# Patient Record
Sex: MALE | Race: WHITE
[De-identification: names, ages, dates, MRNs, and addresses within clinical notes are randomized per-mention and may not be internally consistent; named-entity substitution may affect disease eponyms.]

---

## 2018-05-21 ENCOUNTER — HOSPITAL ENCOUNTER (EMERGENCY)
Dept: HOSPITAL 57 - BURERS | Age: 78
Discharge: HOME | End: 2018-05-21
Payer: MEDICARE

## 2018-05-21 DIAGNOSIS — G20: ICD-10-CM

## 2018-05-21 DIAGNOSIS — I50.9: ICD-10-CM

## 2018-05-21 DIAGNOSIS — M10.9: ICD-10-CM

## 2018-05-21 DIAGNOSIS — B02.9: Primary | ICD-10-CM

## 2018-05-21 PROCEDURE — 99284 EMERGENCY DEPT VISIT MOD MDM: CPT

## 2019-09-11 ENCOUNTER — HOSPITAL ENCOUNTER (EMERGENCY)
Dept: HOSPITAL 57 - BURERS | Age: 79
Discharge: NURSING, REHAB OR LONG-TERM CARE | End: 2019-09-11
Payer: MEDICARE

## 2019-09-11 DIAGNOSIS — S10.11XA: Primary | ICD-10-CM

## 2019-09-11 DIAGNOSIS — I50.9: ICD-10-CM

## 2019-09-11 DIAGNOSIS — M10.9: ICD-10-CM
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
## 2019-09-11 DIAGNOSIS — G20: ICD-10-CM

## 2019-09-11 PROCEDURE — 71045 X-RAY EXAM CHEST 1 VIEW: CPT

## 2019-09-11 NOTE — RAD
PORTABLE CHEST

9/11/19

 

An AP portable film at 1316 is compared with a 12/24/15 study. 

 

The heart is normal in size for AP projection and body habitus. There is no vascular congestion, angel
a, or pleural effusion. There was no signs of pneumonia. 

 

IMPRESSION: 

No acute thoracic finding. 

 

POS: HOME

## 2019-11-06 ENCOUNTER — HOSPITAL ENCOUNTER (EMERGENCY)
Dept: HOSPITAL 57 - BURERS | Age: 79
LOS: 1 days | Discharge: NURSING, REHAB OR LONG-TERM CARE | End: 2019-11-07
Payer: MEDICARE

## 2019-11-06 DIAGNOSIS — I50.9: ICD-10-CM

## 2019-11-06 DIAGNOSIS — M10.9: ICD-10-CM

## 2019-11-06 DIAGNOSIS — F41.9: ICD-10-CM

## 2019-11-06 DIAGNOSIS — R06.02: Primary | ICD-10-CM

## 2019-11-06 DIAGNOSIS — Z79.899: ICD-10-CM

## 2019-11-06 DIAGNOSIS — R09.02: ICD-10-CM

## 2019-11-06 PROCEDURE — 71045 X-RAY EXAM CHEST 1 VIEW: CPT

## 2019-11-07 NOTE — RAD
PORTABLE CHEST:

 

DATE: 11/6/2019.

 

FINDINGS: 

An AP portable film at 2101 is compared with a 9/11/2019 study.

 

Heart size is unchanged.  There is no clear congestion of the vessels.  No lobar consolidation or eff
usion was seen.  Streaking in the left base is no different than before and is probably scarring.  

 

IMPRESSION: 

No change since the prior exam.

 

POS: [facility]

## 2020-04-02 ENCOUNTER — HOSPITAL ENCOUNTER (INPATIENT)
Dept: HOSPITAL 92 - ERS | Age: 80
LOS: 3 days | Discharge: NURSING, REHAB OR LONG-TERM CARE | DRG: 871 | End: 2020-04-05
Attending: INTERNAL MEDICINE | Admitting: INTERNAL MEDICINE
Payer: MEDICARE

## 2020-04-02 VITALS — BODY MASS INDEX: 22.7 KG/M2

## 2020-04-02 DIAGNOSIS — Z88.1: ICD-10-CM

## 2020-04-02 DIAGNOSIS — N39.0: ICD-10-CM

## 2020-04-02 DIAGNOSIS — G93.41: ICD-10-CM

## 2020-04-02 DIAGNOSIS — M10.9: ICD-10-CM

## 2020-04-02 DIAGNOSIS — A41.9: Primary | ICD-10-CM

## 2020-04-02 DIAGNOSIS — Z88.8: ICD-10-CM

## 2020-04-02 DIAGNOSIS — Z79.899: ICD-10-CM

## 2020-04-02 DIAGNOSIS — F02.80: ICD-10-CM

## 2020-04-02 DIAGNOSIS — F41.9: ICD-10-CM

## 2020-04-02 DIAGNOSIS — Z85.828: ICD-10-CM

## 2020-04-02 DIAGNOSIS — G20: ICD-10-CM

## 2020-04-02 DIAGNOSIS — R13.12: ICD-10-CM

## 2020-04-02 DIAGNOSIS — L89.151: ICD-10-CM

## 2020-04-02 LAB
BASOPHILS # BLD AUTO: 0.1 THOU/UL (ref 0–0.2)
BASOPHILS NFR BLD AUTO: 1 % (ref 0–1)
EOSINOPHIL # BLD AUTO: 0.1 THOU/UL (ref 0–0.7)
EOSINOPHIL NFR BLD AUTO: 1.8 % (ref 0–10)
HGB BLD-MCNC: 10.9 G/DL (ref 14–18)
LYMPHOCYTES # BLD: 3.3 THOU/UL (ref 1.2–3.4)
LYMPHOCYTES NFR BLD AUTO: 38.8 % (ref 21–51)
MCH RBC QN AUTO: 32.5 PG (ref 27–31)
MCV RBC AUTO: 99.3 FL (ref 78–98)
MONOCYTES # BLD AUTO: 0.6 THOU/UL (ref 0.11–0.59)
MONOCYTES NFR BLD AUTO: 7.3 % (ref 0–10)
NEUTROPHILS # BLD AUTO: 4.3 THOU/UL (ref 1.4–6.5)
NEUTROPHILS NFR BLD AUTO: 51.2 % (ref 42–75)
PLATELET # BLD AUTO: 206 THOU/UL (ref 130–400)
RBC # BLD AUTO: 3.36 MILL/UL (ref 4.7–6.1)
WBC # BLD AUTO: 8.4 THOU/UL (ref 4.8–10.8)

## 2020-04-02 PROCEDURE — 80048 BASIC METABOLIC PNL TOTAL CA: CPT

## 2020-04-02 PROCEDURE — 81003 URINALYSIS AUTO W/O SCOPE: CPT

## 2020-04-02 PROCEDURE — 87040 BLOOD CULTURE FOR BACTERIA: CPT

## 2020-04-02 PROCEDURE — 36415 COLL VENOUS BLD VENIPUNCTURE: CPT

## 2020-04-02 PROCEDURE — 85025 COMPLETE CBC W/AUTO DIFF WBC: CPT

## 2020-04-02 PROCEDURE — 93005 ELECTROCARDIOGRAM TRACING: CPT

## 2020-04-02 PROCEDURE — 80053 COMPREHEN METABOLIC PANEL: CPT

## 2020-04-02 PROCEDURE — U0001 2019-NCOV DIAGNOSTIC P: HCPCS

## 2020-04-02 PROCEDURE — 83605 ASSAY OF LACTIC ACID: CPT

## 2020-04-02 PROCEDURE — 87804 INFLUENZA ASSAY W/OPTIC: CPT

## 2020-04-02 PROCEDURE — 81015 MICROSCOPIC EXAM OF URINE: CPT

## 2020-04-02 PROCEDURE — 84484 ASSAY OF TROPONIN QUANT: CPT

## 2020-04-02 PROCEDURE — 71045 X-RAY EXAM CHEST 1 VIEW: CPT

## 2020-04-02 NOTE — RAD
RADIOGRAPH CHEST 1 VIEW:



DATE:

4/2/2020



HISTORY:

79-year-old male with dyspnea and hypotension



FINDINGS:

The thoracic aorta is tortuous and ectatic. There is no evidence of consolidation, pulmonary edema, c
ardiomegaly or pneumothorax. The lateral costophrenic angles are not effaced. Prominent

interstitial markings at the lower lung zones, probably chronic. The only interval change compared to
 prior study of 11/6/2019 is the current presence of platelike density at left lower lung zone.



IMPRESSION:

1) No compelling evidence of acute cardiopulmonary disease.

2) ectasia of thoracic aorta.

3) subsegmental atelectasis at left lower lung zone.



Reported By: George Evans 

Electronically Signed:  4/2/2020 11:24 PM

## 2020-04-03 LAB
ALBUMIN SERPL BCG-MCNC: 2.7 G/DL (ref 3.4–4.8)
ALBUMIN SERPL BCG-MCNC: 3 G/DL (ref 3.4–4.8)
ALP SERPL-CCNC: 108 U/L (ref 40–110)
ALP SERPL-CCNC: 118 U/L (ref 40–110)
ALT SERPL W P-5'-P-CCNC: (no result) U/L (ref 8–55)
ALT SERPL W P-5'-P-CCNC: (no result) U/L (ref 8–55)
ANION GAP SERPL CALC-SCNC: 10 MMOL/L (ref 10–20)
ANION GAP SERPL CALC-SCNC: 11 MMOL/L (ref 10–20)
AST SERPL-CCNC: 10 U/L (ref 5–34)
AST SERPL-CCNC: 10 U/L (ref 5–34)
BACTERIA UR QL AUTO: (no result) HPF
BASOPHILS # BLD AUTO: 0.1 THOU/UL (ref 0–0.2)
BASOPHILS NFR BLD AUTO: 0.4 % (ref 0–1)
BILIRUB SERPL-MCNC: 0.3 MG/DL (ref 0.2–1.2)
BILIRUB SERPL-MCNC: 0.3 MG/DL (ref 0.2–1.2)
BUN SERPL-MCNC: 25 MG/DL (ref 8.4–25.7)
BUN SERPL-MCNC: 29 MG/DL (ref 8.4–25.7)
CALCIUM SERPL-MCNC: 7.9 MG/DL (ref 7.8–10.44)
CALCIUM SERPL-MCNC: 8.1 MG/DL (ref 7.8–10.44)
CHLORIDE SERPL-SCNC: 109 MMOL/L (ref 98–107)
CHLORIDE SERPL-SCNC: 112 MMOL/L (ref 98–107)
CO2 SERPL-SCNC: 23 MMOL/L (ref 23–31)
CO2 SERPL-SCNC: 26 MMOL/L (ref 23–31)
CREAT CL PREDICTED SERPL C-G-VRATE: 0 ML/MIN (ref 70–130)
CREAT CL PREDICTED SERPL C-G-VRATE: 0 ML/MIN (ref 70–130)
EOSINOPHIL # BLD AUTO: 0.1 THOU/UL (ref 0–0.7)
EOSINOPHIL NFR BLD AUTO: 1 % (ref 0–10)
GLOBULIN SER CALC-MCNC: 2.8 G/DL (ref 2.4–3.5)
GLOBULIN SER CALC-MCNC: 3.1 G/DL (ref 2.4–3.5)
GLUCOSE SERPL-MCNC: 110 MG/DL (ref 83–110)
GLUCOSE SERPL-MCNC: 95 MG/DL (ref 83–110)
HGB BLD-MCNC: 10.7 G/DL (ref 14–18)
LEUKOCYTE ESTERASE UR QL STRIP.AUTO: 250 LEU/UL
LYMPHOCYTES # BLD: 3 THOU/UL (ref 1.2–3.4)
LYMPHOCYTES NFR BLD AUTO: 22.4 % (ref 21–51)
MCH RBC QN AUTO: 32.2 PG (ref 27–31)
MCV RBC AUTO: 97.9 FL (ref 78–98)
MONOCYTES # BLD AUTO: 0.9 THOU/UL (ref 0.11–0.59)
MONOCYTES NFR BLD AUTO: 7 % (ref 0–10)
NEUTROPHILS # BLD AUTO: 9.2 THOU/UL (ref 1.4–6.5)
NEUTROPHILS NFR BLD AUTO: 69.3 % (ref 42–75)
PLATELET # BLD AUTO: 190 THOU/UL (ref 130–400)
POTASSIUM SERPL-SCNC: 3.9 MMOL/L (ref 3.5–5.1)
POTASSIUM SERPL-SCNC: 4.2 MMOL/L (ref 3.5–5.1)
RBC # BLD AUTO: 3.32 MILL/UL (ref 4.7–6.1)
RBC UR QL AUTO: (no result) HPF (ref 0–3)
SODIUM SERPL-SCNC: 141 MMOL/L (ref 136–145)
SODIUM SERPL-SCNC: 142 MMOL/L (ref 136–145)
WBC # BLD AUTO: 13.3 THOU/UL (ref 4.8–10.8)

## 2020-04-03 RX ADMIN — HEPARIN SODIUM SCH UNITS: 5000 INJECTION, SOLUTION INTRAVENOUS; SUBCUTANEOUS at 20:31

## 2020-04-03 RX ADMIN — Medication SCH: at 20:09

## 2020-04-03 RX ADMIN — HEPARIN SODIUM SCH UNITS: 5000 INJECTION, SOLUTION INTRAVENOUS; SUBCUTANEOUS at 08:45

## 2020-04-03 NOTE — HP
CHIEF COMPLAINT:  Low blood pressure and shortness of breath ?



HISTORY OF PRESENT ILLNESS:  Mr. Bustos is an 79-year-old male with past medical

history of Parkinson disease, dysphagia, congestive heart failure?, chronic

decubitus ulcer to the sacral coccyx area, among others, was brought to the

emergency room from [facility] for sudden onset of shortness of breath.  When

the EMS arrived, the patient was hypotensive on arrival.  Two doses of epinephrine

and 1 L of NS were given.  The patient's blood pressure responded.  It was noted the

patient has been having cough, but is ineffective unable to expectorate.  Workup in

the emergency room, the patient was found to have urinary tract infection.  Septic

workup done in the ED including viral panel and COVID was done.  The patient is

being admitted to hospital for further management. 



PAST MEDICAL HISTORY:  

1. Congestive heart failure ?

2. Skin cancer.

3. Gout.

4. Parkinson disease.

5. Chronic decubitus ulcer to the sacral coccyx area.

6. Dysphagia.



PAST SURGICAL HISTORY:  

1. Tonsillectomy.

2. Neck tumor.

3. Mole removed.



PAST PSYCHIATRIC HISTORY:  Hallucinations, delusions, and anxiety.



SOCIAL HISTORY:  The patient lives in a long-term care facility, [facility].

No history of alcohol use or drug use.  No smoking history. 



HOME MEDICATIONS:  Please see home medication reconciliation form for updated

medications. 



ALLERGIES:  ENTACAPONE, TERFENADINE, TETRACYCLINE.



FAMILY HISTORY:  Reviewed and noncontributory.



REVIEW OF SYSTEMS:  Unable to obtain.  The patient is a poor historian due to

underlying medical condition. 



PHYSICAL EXAMINATION:

GENERAL:  The patient is awake, alert, having ineffective cough, not in acute

distress. 

VITAL SIGNS:  Blood pressure is 100/80, pulse 78, respiratory rate is 16, oxygen

saturation is 94% on room air, and temperature is 98.6. 

HEAD:  Normocephalic and atraumatic. 

NECK:  Supple. 

CHEST:  Coarse bilateral breath sounds. 

HEART:  S1 and S2.  Regular. 

ABDOMEN:  Soft and nontender.  Bowel sounds present. 

NEUROLOGIC:  Awake, alert, moving extremities. 

PSYCHIATRIC:  Unable to assess. 

EXTREMITIES:  No clubbing.  No cyanosis. 

SKIN:  Sacral decubitus ulcer. 

GENITOURINARY:  No suprapubic tenderness.  No flank tenderness.



LABORATORY DATA:  Sodium 142, potassium 4.2, BUN is 29, creatinine 1.3.  Troponin

0.02.  Lactic acid 1.7.  Urinalysis; positive for bacteria, wbc's, nitrite, and

leukocytes.  WBC count is 8.4, hemoglobin 10.9, and platelets 206.  Chest x-ray, no

acute finding.  There is subsegmental atelectasis of the left lower lung zone. 



ASSESSMENT AND PLAN:  

1. Sepsis.  The patient was hypotensive probably secondary to urinary tract

infection. 

2. Suspected COVID infection.

3. Acute urinary tract infection.

4. Parkinson disease.

5. Congestive heart failure, chronic.

6. Chronic decubitus ulcer.

7. Dysphagia.



PLAN:  

1. Admit.

2. Septic workup including viral panel and COVID testing was done in the ED.

3. Cautious IV fluids.

4. IV antibiotics.

5. Reconcile home medications.

6. DVT prophylaxis as appropriate.

7. Expected length of stay, 2 midnights or more.







Job ID:  [id number]

## 2020-04-03 NOTE — PDOC.HOSPP
- Subjective


Encounter Date: 04/03/20


Encounter Time: 08:45


Subjective: 





is resting on bed, not in distress now


not fully oriented per staff, is in restraints as he is trying to pull iv and 

sheets off.





- Objective


Vital Signs & Weight: 


 Vital Signs (12 hours)











  Temp Pulse Resp BP Pulse Ox


 


 04/03/20 09:43  98.2 F  61  20  122/65  99


 


 04/03/20 04:00      97


 


 04/03/20 02:38      97








 Weight











Weight                         188 lb 11.451 oz














Result Diagrams: 


 04/03/20 05:05





 04/03/20 05:05





Hospitalist ROS





- Medication


Medications: 


Active Medications











Generic Name Dose Route Start Last Admin





  Trade Name Freq  PRN Reason Stop Dose Admin


 


Heparin Sodium (Porcine)  5,000 units  04/03/20 09:00  04/03/20 08:45





  Heparin  SC   5,000 units





  BID MAGGI   Administration





     





     





     





     


 


Cefepime HCl 2 gm/ Sodium  100 mls @ 200 mls/hr  04/03/20 12:00  04/03/20 11:50





  Chloride  IVPB   100 mls





  1200,2359 MAGGI   Administration





     





     





     





     


 


Sodium Chloride  1,000 mls @ 40 mls/hr  04/03/20 00:46  04/03/20 02:00





  Normal Saline 0.9%  IV   Not Given





  .Q24H MAGGI   





     





     





     





     














- Exam


General Appearance: ill appearing


Eye: PERRL, anicteric sclera


ENT: no oropharyngeal lesions, dry oral mucosa


Neck: supple, no JVD


Heart: RRR, normal peripheral pulses


Respiratory: normal chest expansion, no tachypnea


Gastrointestinal: no guarding, no rigidity


Extremities: no cyanosis, no edema


Neurological: cranial nerve grossly intact, no focal deficits





Hosp A/P


(1) UTI (urinary tract infection)


Status: Acute   


Qualifiers: 


   Urinary tract infection type: acute cystitis   Hematuria presence: without 

hematuria   Qualified Code(s): N30.00 - Acute cystitis without hematuria   





(2) Sepsis


Code(s): A41.9 - SEPSIS, UNSPECIFIED ORGANISM   Status: Acute   


Qualifiers: 


   Sepsis type: sepsis due to unspecified organism   Sepsis acute organ 

dysfunction status: without acute organ dysfunction   Qualified Code(s): A41.9 

- Sepsis, unspecified organism   





(3) Acute metabolic encephalopathy


Code(s): G93.41 - METABOLIC ENCEPHALOPATHY   Status: Acute   





(4) Parkinson disease


Code(s): G20 - PARKINSON'S DISEASE   Status: Chronic   





(5) Dysphagia


Code(s): R13.10 - DYSPHAGIA, UNSPECIFIED   Status: Chronic   


Qualifiers: 


   Dysphagia type: oropharyngeal phase   Qualified Code(s): R13.12 - Dysphagia, 

oropharyngeal phase   





- Plan





is on vanc and cefepime, await all cultures, covid 19 has been sent from ER 

await results


home dose levodopa, risperdal, pimavanserin, allopurinol, buspar, gentle iv 

hydration


once covid is -ve will get PT/OT and sitter to help


hemostable

## 2020-04-04 RX ADMIN — Medication SCH ML: at 21:00

## 2020-04-04 RX ADMIN — HEPARIN SODIUM SCH UNITS: 5000 INJECTION, SOLUTION INTRAVENOUS; SUBCUTANEOUS at 20:59

## 2020-04-04 RX ADMIN — Medication SCH: at 10:45

## 2020-04-04 RX ADMIN — MULTIPLE VITAMINS W/ MINERALS TAB SCH TAB: TAB at 10:44

## 2020-04-04 RX ADMIN — HEPARIN SODIUM SCH UNITS: 5000 INJECTION, SOLUTION INTRAVENOUS; SUBCUTANEOUS at 10:44

## 2020-04-04 NOTE — PDOC.HOSPP
- Subjective


Encounter Date: 04/04/20


Encounter Time: 08:45


Subjective: 





awakens to verbal stimuli, does not talk much


is not in distress





- Objective


Vital Signs & Weight: 


 Vital Signs (12 hours)











  Temp Pulse Resp BP Pulse Ox


 


 04/04/20 08:30  98.7 F  78  18  127/66  96


 


 04/04/20 04:00  98.5 F  78  20  116/55 L  98


 


 04/04/20 00:35  98.2 F  74  20  108/55 L  99








 Weight











Weight                         191 lb 9.307 oz














Result Diagrams: 


 04/03/20 05:05





 04/03/20 05:05





Hospitalist ROS





- Medication


Medications: 


Active Medications











Generic Name Dose Route Start Last Admin





  Trade Name Freq  PRN Reason Stop Dose Admin


 


Allopurinol  300 mg  04/04/20 09:00  04/04/20 10:44





  Zyloprim  PO   300 mg





  DAILY MAGGI   Administration





     





     





     





     


 


Buspirone HCl  15 mg  04/04/20 09:00  04/04/20 10:44





  Buspar  PO   15 mg





  DAILY MAGGI   Administration





     





     





     





     


 


Carbidopa/Levodopa  1 tab  04/03/20 13:00  04/04/20 10:44





  Sinemet   PO   1 tab





  Q4HR MAGGI   Administration





     





     





     





     


 


Heparin Sodium (Porcine)  5,000 units  04/03/20 09:00  04/04/20 10:44





  Heparin  SC   5,000 units





  BID MAGGI   Administration





     





     





     





     


 


Cefepime HCl 2 gm/ Sodium  100 mls @ 200 mls/hr  04/03/20 12:00  04/04/20 00:26





  Chloride  IVPB   100 mls





  1200,2359 MAGGI   Administration





     





     





     





     


 


Sodium Chloride  1,000 mls @ 75 mls/hr  04/04/20 10:15  04/04/20 10:45





  Normal Saline 0.9%  IV   1,000 mls





  .[id number] MAGGI   Administration





     





     





     





     


 


Iron/Minerals/Multivitamins  1 tab  04/04/20 09:00  04/04/20 10:44





  Theragran M  PO   1 tab





  DAILY MAGGI   Administration





     





     





     





     


 


Risperidone  1 mg  04/03/20 21:00  04/04/20 10:45





  Risperidone  PO   1 mg





  BID MAGGI   Administration





     





     





     





     


 


Sodium Chloride  10 ml  04/03/20 21:00  04/04/20 10:45





  Flush - Normal Saline  IVF   Not Given





  Q12HR MAGGI   





     





     





     





     














- Exam


General Appearance: NAD


Eye: PERRL, anicteric sclera


ENT: no oropharyngeal lesions, dry oral mucosa


Neck: supple, no JVD


Heart: RRR, no murmur


Respiratory: no wheezes, no rales


Gastrointestinal: soft, non-tender, non-distended, normal bowel sounds


Extremities: no cyanosis, no edema


Neurological: cranial nerve grossly intact, no focal deficits





Hosp A/P


(1) UTI (urinary tract infection)


Status: Acute   


Qualifiers: 


   Urinary tract infection type: acute cystitis   Hematuria presence: without 

hematuria   Qualified Code(s): N30.00 - Acute cystitis without hematuria   





(2) Sepsis


Code(s): A41.9 - SEPSIS, UNSPECIFIED ORGANISM   Status: Acute   


Qualifiers: 


   Sepsis type: sepsis due to unspecified organism   Sepsis acute organ 

dysfunction status: without acute organ dysfunction   Qualified Code(s): A41.9 

- Sepsis, unspecified organism   





(3) Acute metabolic encephalopathy


Code(s): G93.41 - METABOLIC ENCEPHALOPATHY   Status: Acute   





(4) Parkinson disease


Code(s): G20 - PARKINSON'S DISEASE   Status: Chronic   





(5) Dysphagia


Code(s): R13.10 - DYSPHAGIA, UNSPECIFIED   Status: Chronic   


Qualifiers: 


   Dysphagia type: oropharyngeal phase   Qualified Code(s): R13.12 - Dysphagia, 

oropharyngeal phase   





- Plan





is on cefepime, blood cultures are -ve, covid 19 has been sent from ER await 

results


home dose levodopa, risperdal, pimavanserin, allopurinol, buspar, gentle iv 

hydration


once covid is -ve will get PT/OT and sitter to help


hemostable


d/w wife over phone and gave full updates

## 2020-04-05 VITALS — DIASTOLIC BLOOD PRESSURE: 73 MMHG | SYSTOLIC BLOOD PRESSURE: 166 MMHG

## 2020-04-05 VITALS — TEMPERATURE: 98.5 F

## 2020-04-05 LAB
ANION GAP SERPL CALC-SCNC: 9 MMOL/L (ref 10–20)
BASOPHILS # BLD AUTO: 0.1 THOU/UL (ref 0–0.2)
BASOPHILS NFR BLD AUTO: 0.9 % (ref 0–1)
BUN SERPL-MCNC: 13 MG/DL (ref 8.4–25.7)
CALCIUM SERPL-MCNC: 8.3 MG/DL (ref 7.8–10.44)
CHLORIDE SERPL-SCNC: 113 MMOL/L (ref 98–107)
CO2 SERPL-SCNC: 27 MMOL/L (ref 23–31)
CREAT CL PREDICTED SERPL C-G-VRATE: 95 ML/MIN (ref 70–130)
EOSINOPHIL # BLD AUTO: 0.2 THOU/UL (ref 0–0.7)
EOSINOPHIL NFR BLD AUTO: 2.5 % (ref 0–10)
GLUCOSE SERPL-MCNC: 90 MG/DL (ref 83–110)
HGB BLD-MCNC: 10.5 G/DL (ref 14–18)
LYMPHOCYTES # BLD: 3.9 THOU/UL (ref 1.2–3.4)
LYMPHOCYTES NFR BLD AUTO: 44.5 % (ref 21–51)
MCH RBC QN AUTO: 32.1 PG (ref 27–31)
MCV RBC AUTO: 98 FL (ref 78–98)
MONOCYTES # BLD AUTO: 0.7 THOU/UL (ref 0.11–0.59)
MONOCYTES NFR BLD AUTO: 8.4 % (ref 0–10)
NEUTROPHILS # BLD AUTO: 3.8 THOU/UL (ref 1.4–6.5)
NEUTROPHILS NFR BLD AUTO: 43.7 % (ref 42–75)
PLATELET # BLD AUTO: 186 THOU/UL (ref 130–400)
POTASSIUM SERPL-SCNC: 3.2 MMOL/L (ref 3.5–5.1)
RBC # BLD AUTO: 3.27 MILL/UL (ref 4.7–6.1)
SODIUM SERPL-SCNC: 146 MMOL/L (ref 136–145)
WBC # BLD AUTO: 8.8 THOU/UL (ref 4.8–10.8)

## 2020-04-05 RX ADMIN — MULTIPLE VITAMINS W/ MINERALS TAB SCH TAB: TAB at 08:44

## 2020-04-05 RX ADMIN — HEPARIN SODIUM SCH UNITS: 5000 INJECTION, SOLUTION INTRAVENOUS; SUBCUTANEOUS at 08:44

## 2020-04-05 RX ADMIN — Medication SCH: at 08:57

## 2020-04-05 NOTE — DIS
DATE OF ADMISSION:  04/03/2020



DATE OF DISCHARGE:  04/05/2020



DISCHARGE DISPOSITION:  To [facility].



PRIMARY DISCHARGE DIAGNOSES:  Sepsis, urinary tract infection, COVID negative, acute

metabolic encephalopathy on admission resolved, Parkinson disease with underlying

dementia, dysphagia which is chronic and he is on pureed diet. 



PROCEDURES DONE DURING HOSPITALIZATION:  Chest x-ray done showed no acute

cardiopulmonary disease.  The ectasia seen of thoracic aorta.  Blood cultures x2, no

growth.  Influenza A and B nasal swab were negative.  H and H 10 and 32, platelet

count 186, white count of 8.8 on the day of discharge with 43% neutrophils and 44%

lymphocytes.  BUN and creatinine are 13 and 0.7.  Albumin 2.7.  COVID-19 PCR not

detected.  UA was positive for UTI. 



DISCHARGE MEDICATIONS:  

1. Levaquin 500 mg p.o. daily for another 5 days.

2. K-Dur 40 mEq p.o. twice daily for total of three doses.

3. Risperdal 1 mg p.o. twice daily.

4. Ultram 25 mg p.o. q.8 hourly p.r.n.

5. MiraLAX 17 g p.o. daily p.r.n.

6. Pimavanserin 34 mg p.o. at bedtime.

7. Multivitamin one tablet once daily.

8. Carbidopa/levodopa 25/100 mg p.o. q.4 hourly.

9. Buspirone 15 mg p.o. daily.

10. Allopurinol 300 mg p.o. daily.

11. Albuterol nebulizer q.4 hourly p.r.n.



ALLERGIES:  TO ENTACAPONE, TERFENADINE, TETRACYCLINE.



DISCHARGE PLAN:  The patient to follow up with Dr. Leta Chávez in 1 week at the

[facility] home. 



BRIEF COURSE DURING HOSPITALIZATION:  The patient initially was sent over from

[facility] for complaints of low blood pressure and shortness of

breath.  He was essentially admitted for sepsis, urinary tract infection and to rule

out COVID-19.  He has had fluid hydration done and was on broad-spectrum IV

antibiotics which has been transitioned to Levaquin.  COVID-19 PCR was negative.

The patient's cognitive function came back to normal this morning.  He was

interacting, although not fully oriented, which is his baseline.  He has underlying

dementia with Parkinson disease.  Unfortunately, urine culture was not obtained and

he is empirically placed on Levaquin at present.  He has been afebrile all through

his stay here in the hospital.  He has started to eat well from last night and is

finishing up his meals with pureed diet.  He needs more encouragement to eat well at

the nursing home and likely will need prompting and assistance for feeding.  Please

see a face-to-face documentation for the day of discharge on [employer].  A total of

35 minutes was spent on discharge plan. 







Job ID:  [id number]

## 2020-04-05 NOTE — PDOC.HOSPP
- Subjective


Encounter Date: 04/05/20


Encounter Time: 11:00


Subjective: 





more awake, tries to converse, is watching tv


not fully oriented


is eating well per staff





- Objective


Vital Signs & Weight: 


 Vital Signs (12 hours)











  Temp Pulse Resp BP Pulse Ox


 


 04/05/20 08:00      96


 


 04/05/20 07:56  98.5 F  71  18  140/79  96


 


 04/05/20 05:15  98.5 F  67  18  139/74  97








 Weight











Weight                         167 lb 14.4 oz














I&O: 


 











 04/04/20 04/05/20 04/06/20





 06:59 06:59 06:59


 


Intake Total  1175 


 


Balance  1175 











Result Diagrams: 


 04/05/20 05:05





 04/05/20 05:05





Hospitalist ROS





- Medication


Medications: 


Active Medications











Generic Name Dose Route Start Last Admin





  Trade Name Freq  PRN Reason Stop Dose Admin


 


Allopurinol  300 mg  04/04/20 09:00  04/05/20 08:44





  Zyloprim  PO   300 mg





  DAILY MAGGI   Administration





     





     





     





     


 


Buspirone HCl  15 mg  04/04/20 09:00  04/05/20 08:43





  Buspar  PO   15 mg





  DAILY MAGGI   Administration





     





     





     





     


 


Carbidopa/Levodopa  1 tab  04/03/20 13:00  04/05/20 13:33





  Sinemet   PO   1 tab





  Q4HR MAGGI   Administration





     





     





     





     


 


Heparin Sodium (Porcine)  5,000 units  04/03/20 09:00  04/05/20 08:44





  Heparin  SC   5,000 units





  BID MAGGI   Administration





     





     





     





     


 


Cefepime HCl 2 gm/ Sodium  100 mls @ 200 mls/hr  04/03/20 12:00  04/05/20 11:50





  Chloride  IVPB   100 mls





  1200,2359 MAGGI   Administration





     





     





     





     


 


Dextrose/Water  1,000 mls @ 100 mls/hr  04/05/20 08:30  04/05/20 08:42





  D5w  IV   1,000 mls





  .Q10H MAGGI   Administration





     





     





     





     


 


Iron/Minerals/Multivitamins  1 tab  04/04/20 09:00  04/05/20 08:44





  Theragran M  PO   1 tab





  DAILY MAGGI   Administration





     





     





     





     


 


Risperidone  1 mg  04/03/20 21:00  04/05/20 11:49





  Risperidone  PO   1 mg





  BID MAGGI   Administration





     





     





     





     


 


Sodium Chloride  10 ml  04/03/20 21:00  04/05/20 08:57





  Flush - Normal Saline  IVF   Not Given





  Q12HR MAGGI   





     





     





     





     














- Exam


General Appearance: awake alert


Eye: PERRL, anicteric sclera


ENT: no oropharyngeal lesions, moist mucosa


Neck: supple, no JVD


Heart: RRR, no murmur


Respiratory: no wheezes, no rales


Gastrointestinal: soft, non-tender, non-distended, normal bowel sounds


Extremities: no cyanosis, no edema


Neurological: cranial nerve grossly intact, no focal deficits





Hosp A/P


(1) UTI (urinary tract infection)


Status: Acute   


Qualifiers: 


   Urinary tract infection type: acute cystitis   Hematuria presence: without 

hematuria   Qualified Code(s): N30.00 - Acute cystitis without hematuria   





(2) Sepsis


Code(s): A41.9 - SEPSIS, UNSPECIFIED ORGANISM   Status: Acute   


Qualifiers: 


   Sepsis type: sepsis due to unspecified organism   Sepsis acute organ 

dysfunction status: without acute organ dysfunction   Qualified Code(s): A41.9 

- Sepsis, unspecified organism   





(3) Acute metabolic encephalopathy


Code(s): G93.41 - METABOLIC ENCEPHALOPATHY   Status: Acute   





(4) Parkinson disease


Code(s): G20 - PARKINSON'S DISEASE   Status: Chronic   





(5) Dysphagia


Code(s): R13.10 - DYSPHAGIA, UNSPECIFIED   Status: Chronic   


Qualifiers: 


   Dysphagia type: oropharyngeal phase   Qualified Code(s): R13.12 - Dysphagia, 

oropharyngeal phase   





- Plan





is on levaquin oral, blood cultures are -ve, covid 19 is -ve


home dose levodopa, risperdal, pimavanserin, allopurinol, buspar, gentle iv 

hydration


PT/OT eval


hemostable


may dc if snf will take him today


encourage oral intake and needs help feeding/prompting

## 2020-04-08 NOTE — EKG
Test Reason : 

Blood Pressure : ***/*** mmHG

Vent. Rate : 077 BPM     Atrial Rate : 077 BPM

   P-R Int : 170 ms          QRS Dur : 132 ms

    QT Int : 406 ms       P-R-T Axes : 069 -39 028 degrees

   QTc Int : 459 ms

 

Sinus rhythm with Fusion complexes

Possible Left atrial enlargement

Left axis deviation

Right bundle branch block

Abnormal ECG

 

Confirmed by ARPIT KHAN ([id number]),  BIRGIT SILVEIRA ([id number]) on 4/8/2020 1:42:41 PM

 

Referred By:             Confirmed By:ARPIT KHAN

## 2020-04-10 ENCOUNTER — HOSPITAL ENCOUNTER (INPATIENT)
Dept: HOSPITAL 92 - IMCU/EMU | Age: 80
LOS: 3 days | Discharge: NURSING, REHAB OR LONG-TERM CARE | DRG: 177 | End: 2020-04-13
Attending: INTERNAL MEDICINE | Admitting: INTERNAL MEDICINE
Payer: MEDICARE

## 2020-04-10 ENCOUNTER — HOSPITAL ENCOUNTER (EMERGENCY)
Dept: HOSPITAL 57 - BURERS | Age: 80
Discharge: HOME | End: 2020-04-10
Payer: MEDICARE

## 2020-04-10 VITALS — BODY MASS INDEX: 24.3 KG/M2

## 2020-04-10 DIAGNOSIS — I95.9: ICD-10-CM

## 2020-04-10 DIAGNOSIS — Z88.8: ICD-10-CM

## 2020-04-10 DIAGNOSIS — M10.9: ICD-10-CM

## 2020-04-10 DIAGNOSIS — G20: ICD-10-CM

## 2020-04-10 DIAGNOSIS — Z85.828: ICD-10-CM

## 2020-04-10 DIAGNOSIS — F03.90: ICD-10-CM

## 2020-04-10 DIAGNOSIS — Z51.5: ICD-10-CM

## 2020-04-10 DIAGNOSIS — F02.80: ICD-10-CM

## 2020-04-10 DIAGNOSIS — R09.02: Primary | ICD-10-CM

## 2020-04-10 DIAGNOSIS — J96.01: ICD-10-CM

## 2020-04-10 DIAGNOSIS — J69.0: Primary | ICD-10-CM

## 2020-04-10 DIAGNOSIS — I50.9: ICD-10-CM

## 2020-04-10 DIAGNOSIS — F41.9: ICD-10-CM

## 2020-04-10 DIAGNOSIS — R13.10: ICD-10-CM

## 2020-04-10 DIAGNOSIS — Z79.899: ICD-10-CM

## 2020-04-10 DIAGNOSIS — Z66: ICD-10-CM

## 2020-04-10 LAB
ALBUMIN SERPL BCG-MCNC: 2.9 G/DL (ref 3.4–4.8)
ALP SERPL-CCNC: 90 U/L (ref 40–110)
ALT SERPL W P-5'-P-CCNC: (no result) U/L (ref 8–55)
ANION GAP SERPL CALC-SCNC: 13 MMOL/L (ref 10–20)
AST SERPL-CCNC: 13 U/L (ref 5–34)
BACTERIA UR QL AUTO: (no result) HPF
BILIRUB SERPL-MCNC: 0.4 MG/DL (ref 0.2–1.2)
BUN SERPL-MCNC: 24 MG/DL (ref 8.4–25.7)
CA-I BLDV-SCNC: 1.16 MMOL/L
CALCIUM SERPL-MCNC: 8.4 MG/DL (ref 7.8–10.44)
CHLORIDE BLDV-SCNC: 106 MMOL/L (ref 98–107)
CHLORIDE SERPL-SCNC: 110 MMOL/L (ref 98–107)
CO2 BLDV CALC-SCNC: 29 MMOL/L (ref 22–28)
CO2 SERPL-SCNC: 26 MMOL/L (ref 23–31)
CREAT CL PREDICTED SERPL C-G-VRATE: 0 ML/MIN (ref 70–130)
GLOBULIN SER CALC-MCNC: 2.6 G/DL (ref 2.4–3.5)
GLUCOSE SERPL-MCNC: 101 MG/DL (ref 83–110)
HCO3 BLDV-SCNC: 27.7 MMOL/L (ref 22–28)
HCT VFR BLD CALC: 31 % (ref 42–52)
HGB BLD CALC-MCNC: 10.5 G/DL (ref 14–18)
HGB BLD-MCNC: 10.4 G/DL (ref 14–18)
MCH RBC QN AUTO: 30.7 PG (ref 27–31)
MCV RBC AUTO: 98.6 FL (ref 78–98)
MDIFF COMPLETE?: YES
PCO2 BLDV: 41.3 MMHG (ref 40–50)
PLATELET # BLD AUTO: 181 THOU/UL (ref 130–400)
POTASSIUM BLDV-SCNC: 5 MMOL/L (ref 3.5–5.1)
POTASSIUM SERPL-SCNC: 5.1 MMOL/L (ref 3.5–5.1)
PROT UR STRIP.AUTO-MCNC: 30 MG/DL
RBC # BLD AUTO: 3.38 MILL/UL (ref 4.7–6.1)
RBC UR QL AUTO: (no result) HPF (ref 0–3)
SAO2 % BLDV FROM PO2: 95.9 % (ref 60–85)
SODIUM BLDV-SCNC: 143 MMOL/L (ref 138–145)
SODIUM SERPL-SCNC: 144 MMOL/L (ref 136–145)
TOXIC GRANULES BLD QL SMEAR: SLIGHT
WBC # BLD AUTO: 19 THOU/UL (ref 4.8–10.8)
WBC UR QL AUTO: (no result) HPF (ref 0–3)

## 2020-04-10 PROCEDURE — 71045 X-RAY EXAM CHEST 1 VIEW: CPT

## 2020-04-10 PROCEDURE — 36415 COLL VENOUS BLD VENIPUNCTURE: CPT

## 2020-04-10 PROCEDURE — 96366 THER/PROPH/DIAG IV INF ADDON: CPT

## 2020-04-10 PROCEDURE — 87635 SARS-COV-2 COVID-19 AMP PRB: CPT

## 2020-04-10 PROCEDURE — 96365 THER/PROPH/DIAG IV INF INIT: CPT

## 2020-04-10 PROCEDURE — 83880 ASSAY OF NATRIURETIC PEPTIDE: CPT

## 2020-04-10 PROCEDURE — 84484 ASSAY OF TROPONIN QUANT: CPT

## 2020-04-10 PROCEDURE — 80053 COMPREHEN METABOLIC PANEL: CPT

## 2020-04-10 PROCEDURE — 81015 MICROSCOPIC EXAM OF URINE: CPT

## 2020-04-10 PROCEDURE — 85025 COMPLETE CBC W/AUTO DIFF WBC: CPT

## 2020-04-10 PROCEDURE — 87086 URINE CULTURE/COLONY COUNT: CPT

## 2020-04-10 PROCEDURE — U0002 COVID-19 LAB TEST NON-CDC: HCPCS

## 2020-04-10 PROCEDURE — 8E0ZXY6 ISOLATION: ICD-10-PCS | Performed by: INTERNAL MEDICINE

## 2020-04-10 PROCEDURE — 82330 ASSAY OF CALCIUM: CPT

## 2020-04-10 PROCEDURE — 80202 ASSAY OF VANCOMYCIN: CPT

## 2020-04-10 PROCEDURE — 81003 URINALYSIS AUTO W/O SCOPE: CPT

## 2020-04-10 PROCEDURE — 96367 TX/PROPH/DG ADDL SEQ IV INF: CPT

## 2020-04-10 PROCEDURE — 87040 BLOOD CULTURE FOR BACTERIA: CPT

## 2020-04-10 PROCEDURE — 87633 RESP VIRUS 12-25 TARGETS: CPT

## 2020-04-10 PROCEDURE — 51701 INSERT BLADDER CATHETER: CPT

## 2020-04-10 PROCEDURE — 80048 BASIC METABOLIC PNL TOTAL CA: CPT

## 2020-04-10 PROCEDURE — 87804 INFLUENZA ASSAY W/OPTIC: CPT

## 2020-04-10 PROCEDURE — 82803 BLOOD GASES ANY COMBINATION: CPT

## 2020-04-10 PROCEDURE — 94760 N-INVAS EAR/PLS OXIMETRY 1: CPT

## 2020-04-10 PROCEDURE — 83605 ASSAY OF LACTIC ACID: CPT

## 2020-04-10 RX ADMIN — SCOPALAMINE SCH MG: 1 PATCH, EXTENDED RELEASE TRANSDERMAL at 12:22

## 2020-04-10 NOTE — RAD
PORTABLE CHEST:

 

Date:  04/10/2020

 

An AP portable film at 0511 hours is compared with the 04/02/2020 study. 

 

FINDINGS:

The heart size is about the same, being upper normal. There is some prominence of the perihilar inter
stitial markings. This could be some early congestion or infection. Correlate with clinical presentat
ion. The periphery of the lungs is clear. There may be some slight blunting of the left costophrenic 
angle. 

 

IMPRESSION: 

Slight prominence of perihilar markings. 

 

 

POS: HOME

## 2020-04-10 NOTE — HP
CHIEF COMPLAINT:  Transferred for suspected sepsis.



HISTORY OF PRESENT ILLNESS:  The patient is an 79-year-old male with history of

Parkinson disease, dementia, and congestive heart failure, who is a chronic nursing

home resident.  The patient was recently discharged from the hospital after being

admitted for sepsis and possible COVID-19.  His test results were negative on 04/05.

 The patient was discharged to the nursing home and today, he is sent back due to

worsening shortness of breath.  The patient was sent to [facility] where his

initial workup revealed leukocytosis, hypoxia, and hypotension.  The patient is DNR

and DNI.  He was sent to our facility for further management.  Upon arrival, the

patient's vital signs revealed hypoxia that is corrected with 4 L nasal cannula.

The patient is confused, which is chronic for him.  His systolic blood pressure is

marginal.  He received 2 L of IV fluids prior to arrival.  His chest x-ray revealed

bilateral hilar infiltrates.  I have discussed the patient's condition with his

wife, Ms. Mariama Bustos, who stated that the patient does not want to be resuscitated or

intubated.  No aggressive measures to be performed on this patient either and that

includes pressors. 



REVIEW OF SYSTEMS:  Unable to obtain due to confusion and dementia.



PAST MEDICAL HISTORY:  Skin cancer, gout, Parkinson disease, dementia, dysphagia,

chronic decubitus ulcer, possible congestive heart failure. 



PAST SURGICAL HISTORY:  Tonsillectomy, neck tumor removal, skin tumor removal.



SOCIAL HISTORY:  The patient lives at a long-term care facility [facility].  No

alcohol or drug use or smoking. 



ALLERGIES:  THE PATIENT IS ALLERGIC TO ENTACAPONE, TERFENADINE, AND TETRACYCLINE.



PHYSICAL EXAMINATION:

GENERAL:  The patient is confused, lying in bed and does not appear to be in any

respiratory distress at this time. 

HEAD:  Normocephalic and atraumatic. 

NECK:  Supple.  No JVD. 

CHEST:  Showing minimal crackles bilaterally. 

CARDIAC:  Revealed normal S1 and S2.  No murmurs, rubs, or gallops. 

ABDOMEN:  Soft, nontender, nondistended. 

NEUROLOGICAL:  The patient has no focal deficits.



ASSESSMENT:  

1. Septic shock.

2. Suspected COVID infection.

3. Mild pulmonary edema.

4. Dysphagia.

5. Suspected healthcare-associated pneumonia.

6. Parkinson disease.

7. Dementia.



PLAN:  The patient will be admitted to the medical unit.  We will start

broad-spectrum antibiotics, but otherwise we will keep the patient on comfort

measures per family request.  Supplement oxygen as needed up to a non-rebreather.

Morphine 2 to 4 mg IV q.2 hours as needed for pain, Ativan 1 mg IV q.4 hours as

needed for anxiety and agitation, scopolamine patch, and Tylenol 650 mg per rectal

q.6 hours as needed for fever. 







Job ID:  [id number]

## 2020-04-11 LAB
ANION GAP SERPL CALC-SCNC: 10 MMOL/L (ref 10–20)
BUN SERPL-MCNC: 22 MG/DL (ref 8.4–25.7)
CALCIUM SERPL-MCNC: 8.7 MG/DL (ref 7.8–10.44)
CHLORIDE SERPL-SCNC: 111 MMOL/L (ref 98–107)
CO2 SERPL-SCNC: 29 MMOL/L (ref 23–31)
CREAT CL PREDICTED SERPL C-G-VRATE: 57 ML/MIN (ref 70–130)
GLUCOSE SERPL-MCNC: 95 MG/DL (ref 83–110)
POTASSIUM SERPL-SCNC: 4.1 MMOL/L (ref 3.5–5.1)
SODIUM SERPL-SCNC: 146 MMOL/L (ref 136–145)

## 2020-04-11 NOTE — PDOC.HOSPP
- Subjective


Encounter Date: 04/11/20


Encounter Time: 08:00


Subjective: 





no overnight events and no clinical change. Covid -ve. 





- Objective


Vital Signs & Weight: 


 Vital Signs (12 hours)











  Temp Pulse Resp BP Pulse Ox


 


 04/11/20 08:40  100.1 F H  92  19  91/48 L  97








 Weight











Weight                         169 lb 15.975 oz














I&O: 


 











 04/10/20 04/11/20 04/12/20





 06:59 06:59 06:59


 


Intake Total  250 


 


Balance  250 











Result Diagrams: 


 04/11/20 06:07





Hospitalist ROS





- Review of Systems


ROS unobtainable: due to mental status





- Medication


Medications: 


Active Medications











Generic Name Dose Route Start Last Admin





  Trade Name Freq  PRN Reason Stop Dose Admin


 


Vancomycin HCl 1.25 gm/ Sodium  250 mls @ 166.667 mls/hr  04/11/20 06:00  04/11/ 20 05:31





  Chloride  IVPB   250 mls





  0600 MAGGI   Administration





     





     





     





     


 


Piperacillin Sod/Tazobactam  100 mls @ 200 mls/hr  04/10/20 14:00  04/11/20 15:

41





  Sod 3.375 gm/ Sodium Chloride  IVPB   100 mls





  0200,0800,1400,2000 MAGGI   Administration





     





     





     





     


 


Morphine Sulfate  2 mg  04/10/20 12:00  04/11/20 18:25





  Morphine  SLOW IVP   2 mg





  Q2HR MAGGI   Administration





     





     





     





     


 


Scopolamine  1.5 mg  04/10/20 11:30  04/10/20 12:22





  Transderm Scop  TD   1.5 mg





  Q3D MAGGI   Administration





     





     





     





     














- Exam


General Appearance: ill appearing


Heart: RRR, no murmur, no gallops, no rubs


Respiratory: CTAB, no wheezes, no ronchi, rales


Gastrointestinal: soft, non-tender, non-distended, normal bowel sounds


Psychiatric - other findings: drowsy





Hosp A/P





- Plan





#CAP


-patient being treated for CAP/aspirration pneumonia; however considering 

significant comorbidities, family requested transition to comfort care while on 

antiobiotics





-continue comfort care, continue antibiotics





otherwise management unchanged

## 2020-04-12 LAB
ANION GAP SERPL CALC-SCNC: 10 MMOL/L (ref 10–20)
BUN SERPL-MCNC: 23 MG/DL (ref 8.4–25.7)
CALCIUM SERPL-MCNC: 8.6 MG/DL (ref 7.8–10.44)
CHLORIDE SERPL-SCNC: 116 MMOL/L (ref 98–107)
CO2 SERPL-SCNC: 27 MMOL/L (ref 23–31)
CREAT CL PREDICTED SERPL C-G-VRATE: 66 ML/MIN (ref 70–130)
GLUCOSE SERPL-MCNC: 105 MG/DL (ref 83–110)
POTASSIUM SERPL-SCNC: 3.8 MMOL/L (ref 3.5–5.1)
SODIUM SERPL-SCNC: 149 MMOL/L (ref 136–145)
VANCOMYCIN TROUGH SERPL-MCNC: 9.6 UG/ML

## 2020-04-12 RX ADMIN — VANCOMYCIN SCH MLS: 1.5 INJECTION, SOLUTION INTRAVENOUS at 05:41

## 2020-04-12 NOTE — PDOC.HOSPP
- Subjective


Encounter Date: 04/12/20


Encounter Time: 10:00


Subjective: 





no overnight events and no change.





- Objective


Vital Signs & Weight: 


 Vital Signs (12 hours)











  Pulse Ox


 


 04/12/20 20:00  98








 Weight











Weight                         169 lb 15.975 oz














I&O: 


 











 04/11/20 04/12/20 04/13/20





 06:59 06:59 06:59


 


Intake Total 250 250 


 


Balance 250 250 











Result Diagrams: 


 04/12/20 04:54





Hospitalist ROS





- Medication


Medications: 


Active Medications











Generic Name Dose Route Start Last Admin





  Trade Name Freq  PRN Reason Stop Dose Admin


 


Piperacillin Sod/Tazobactam  100 mls @ 200 mls/hr  04/10/20 14:00  04/12/20 20:

33





  Sod 3.375 gm/ Sodium Chloride  IVPB   100 mls





  0200,0800,1400,2000 [facility]   Administration





     





     





     





     


 


Vancomycin HCl 1.5 gm/ Device  300 mls @ 200 mls/hr  04/12/20 06:00  04/12/20 05

:41





  IVPB   300 mls





  0600 [facility]   Administration





     





     





     





     


 


Lorazepam  1 mg  04/10/20 11:28  04/12/20 17:14





  Ativan  SLOW IVP   1 mg





  Q4H PRN   Administration





  Anxiety/Agitation   





     





     





     


 


Morphine Sulfate  2 mg  04/10/20 12:00  04/12/20 20:31





  Morphine  SLOW IVP   2 mg





  Q2HR MAGGI   Administration





     





     





     





     


 


Scopolamine  1.5 mg  04/10/20 11:30  04/10/20 12:22





  Transderm Scop  TD   1.5 mg





  Q3D MAG[facility]   Administration





     





     





     





     














- Exam


General Appearance: ill appearing


Heart: RRR, no murmur, no gallops, no rubs


Respiratory: CTAB, no wheezes, no rales, no ronchi


Gastrointestinal: soft, non-distended, normal bowel sounds


Psychiatric: lethargic





Hosp A/P





- Plan





#CAP


-patient being treated for CAP/aspirration pneumonia; however considering 

significant comorbidities, family requested transition to comfort care while on 

antiobiotics





-continue comfort care, continue antibiotics





otherwise management unchanged

## 2020-04-13 VITALS — TEMPERATURE: 98.2 F | DIASTOLIC BLOOD PRESSURE: 50 MMHG | SYSTOLIC BLOOD PRESSURE: 91 MMHG

## 2020-04-13 LAB
ANION GAP SERPL CALC-SCNC: 12 MMOL/L (ref 10–20)
BUN SERPL-MCNC: 24 MG/DL (ref 8.4–25.7)
CALCIUM SERPL-MCNC: 9.2 MG/DL (ref 7.8–10.44)
CHLORIDE SERPL-SCNC: 120 MMOL/L (ref 98–107)
CO2 SERPL-SCNC: 26 MMOL/L (ref 23–31)
CREAT CL PREDICTED SERPL C-G-VRATE: 65 ML/MIN (ref 70–130)
GLUCOSE SERPL-MCNC: 103 MG/DL (ref 83–110)
POTASSIUM SERPL-SCNC: 3.6 MMOL/L (ref 3.5–5.1)
SODIUM SERPL-SCNC: 154 MMOL/L (ref 136–145)

## 2020-04-13 RX ADMIN — VANCOMYCIN SCH MLS: 1.5 INJECTION, SOLUTION INTRAVENOUS at 05:24

## 2020-04-13 RX ADMIN — SCOPALAMINE SCH MG: 1 PATCH, EXTENDED RELEASE TRANSDERMAL at 10:21

## 2020-04-13 NOTE — PQF
CLINICAL DOCUMENTATION IMPROVEMENT CLARIFICATION FORM:  ICD-10 Updated



PLEASE DO AN ADDENDUM TO THE PROGRESS NOTE WITH ANY DOCUMENTATION UPDATES OR 
ADDITIONS AND CARRY THROUGH TO DC SUMMARY.   THANK YOU.



DATE:           4/13/20                                      ATTN:  DR. TOBIAS



Please exercise your independent, professional judgment in responding to the 
clarification form. 

Clinical indicators are provided on the bottom of this form for your review



Please check appropriate box(s):

[ x ] Acute Respiratory Failure:                     [x  ] with Hypoxia[  ] 
with Hypercapnia

[  ] Acute On Chronic Respiratory Failure:  [  ] with Hypoxia [  ] with 
Hypercapnia

[  ] Acute Respiratory Failure due to: (etiology) ______________________ 

[  ] ARDS (Acute Respiratory Distress Syndrome)

[  ] Chronic Respiratory Failure only        [  ] with Hypoxia       [  ] with 
Hypercapnia

[  ] Respiratory Insufficiency

[  ] Hypoxia

[  ] Other diagnosis ___________

[  ] Unable to determine



In addition, please specify:

Present on Admission (POA):  [ x ] Yes             [  ] No             [  ] 
Unable to determine



For continuity of documentation, please document condition throughout progress 
notes and discharge summary.  Thank You.





CLINICAL INDICATORS - SIGNS / SYMPTOMS / LABS    / RESULTS AND LOCATION IN MR





HYPOXIA (PER H&P)



CXR: BILATERAL HILAR INFILTRATES (PER H&P)



RISKS:

PNEUMONIA (H&P)

SUSPECTED COVID

HOSPITALIZATION 4/5



TREATMENT:

SUPPLEMENTAL OXYGEN - 4L

RT CONSULT FOR O2 MANAGEMENT

COVID 19 TESTING



Acute Respiratory Failure:  ABG pH < 7.35 or > 7.45; Decreased oxygen 
saturation (<90% room air or < 95% on 

PCO2 > 50 mm Hg; PO2 < 60 mm Hg; Labored or rapid respirations



ARDS: Dx Criteria [[place] ARDS]: Respiratory symptoms within one week of a 
known clinical insult (e.g. shock, infection, surgery, trauma)   Bilateral 
opacities in CXR/Chest CT not due to CHF or fluid





(This form is maintained as a part of the permanent medical record)

 2015 [employer].  All Rights Reserved





[email or web address]    Cell Phone:  [phone]

                                                              

 



[facility]

## 2020-04-13 NOTE — PQF
CLINICAL DOCUMENTATION IMPROVEMENT CLARIFICATION FORM:  ICD-10 Updated



PLEASE DO AN ADDENDUM TO THE PROGRESS NOTE WITH ANY DOCUMENTATION UPDATES OR 
ADDITIONS AND CARRY THROUGH TO DC SUMMARY.   THANK YOU.



DATE:           4/13/20                                          ATTN:  DR. TOBIAS





Please exercise your independent, professional judgment in responding to the 
clarification form. 

Clinical indicators are provided on the bottom of this form for your review





Please check appropriate box(s) to clarify if the following diagnosis has been 
ruled in or  ruled out:  SEPSIS





[  ] Ruled in diagnosis

     [  ] Continue to treat        [  ] Resolved

[ x ] Ruled out diagnosis

[  ] Improving

[  ] Cannot rule out diagnosis

[  ] Other diagnosis ___________

[  ] Unable to determine



In addition, please specify:

Present on Admission (POA):  [ x  ] Yes             [  ] No             [  ] 
Unable to determine



For continuity of documentation, please document condition throughout progress 
notes and discharge summary.  Thank You.



CLINICAL INDICATORS - SIGNS / SYMPTOMS / LABS  / RESULTS AND LOCATION IN MR





H&P:  "SUSPECTED SEPSIS"



BP 91/48

TEMP 100.1



RISKS:

PNEUMONIA (H&P)

SUSPECTED COVID

HOSPITALIZATION 4/5



TREATMENT:

IV ZOSYN (4/10-4/13)

IV VANCOMYCIN (4/12-4/13)

IVF PTA (H&P)



[employer] Win[employer] Viewer



(This form is maintained as a part of the permanent medical record)

 2015 [employer].  All Rights Reserved

[email or web address]    Cell Phone:  [phone]

                                                              

 







[facility]

## 2020-04-14 NOTE — DIS
DATE OF ADMISSION:  04/10/2020



DATE OF DISCHARGE:  04/13/2020



HISTORY:  Mr. Bustos is an 79-year-old male with a medical history of Parkinson

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

resident, who presented with worsening shortness of breath. 



He was diagnosed with pneumonia, likely aspiration and was started on broad-spectrum

antibiotics.  The family was contacted and requested to transition the patient to

comfort care.  The patient was transitioned to comfort care and remained in no

distress throughout inpatient care prior to discharging him to his previous

institution with hospice care. 



PHYSICAL EXAMINATION:

VITAL SIGNS:  Blood pressure 91/50, pulse 72, respiratory rate 20, temperature 98.2,

and oxygen saturation 94% on 4 L of nasal cannula. 

GENERAL APPEARANCE:  Ill-appearing, no apparent distress. 

CARDIAC:  Regular rate and rhythm.  No murmur.  No gallops.  No rubs. 

RESPIRATORY:  Clear to auscultation bilaterally.  No wheezes.  No rales.  No

rhonchi. 

GI:  Soft, nondistended with normal bowel sounds. 

NEUROLOGIC:  Lethargic, withdraws to pain, but otherwise unresponsive. 



Prior to discharge, the patient's wife was contacted and was in agreement with

transition the patient to hospice, comfort care in his nursing home institution. 







Job ID:  [id number]